# Patient Record
Sex: MALE | Race: WHITE | NOT HISPANIC OR LATINO | URBAN - METROPOLITAN AREA
[De-identification: names, ages, dates, MRNs, and addresses within clinical notes are randomized per-mention and may not be internally consistent; named-entity substitution may affect disease eponyms.]

---
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
## 2018-07-20 ENCOUNTER — OFFICE VISIT - RIVER FALLS (OUTPATIENT)
Dept: FAMILY MEDICINE | Facility: CLINIC | Age: 18
End: 2018-07-20

## 2018-07-20 ASSESSMENT — MIFFLIN-ST. JEOR: SCORE: 1795.04

## 2020-05-29 ENCOUNTER — OFFICE VISIT - RIVER FALLS (OUTPATIENT)
Dept: FAMILY MEDICINE | Facility: CLINIC | Age: 20
End: 2020-05-29

## 2020-05-29 ENCOUNTER — TRANSFERRED RECORDS (OUTPATIENT)
Dept: HEALTH INFORMATION MANAGEMENT | Facility: CLINIC | Age: 20
End: 2020-05-29

## 2020-06-01 ENCOUNTER — MEDICAL CORRESPONDENCE (OUTPATIENT)
Dept: HEALTH INFORMATION MANAGEMENT | Facility: CLINIC | Age: 20
End: 2020-06-01

## 2020-06-02 ENCOUNTER — COMMUNICATION - RIVER FALLS (OUTPATIENT)
Dept: FAMILY MEDICINE | Facility: CLINIC | Age: 20
End: 2020-06-02

## 2020-06-02 ENCOUNTER — OFFICE VISIT - RIVER FALLS (OUTPATIENT)
Dept: FAMILY MEDICINE | Facility: CLINIC | Age: 20
End: 2020-06-02

## 2020-06-02 ENCOUNTER — TRANSCRIBE ORDERS (OUTPATIENT)
Dept: OTHER | Age: 20
End: 2020-06-02

## 2020-06-02 ENCOUNTER — RECORDS - HEALTHEAST (OUTPATIENT)
Dept: ADMINISTRATIVE | Facility: OTHER | Age: 20
End: 2020-06-02

## 2020-06-02 DIAGNOSIS — D69.6 THROMBOCYTOPENIA (H): Primary | ICD-10-CM

## 2020-06-02 LAB — HETEROPHILE - HISTORICAL: NEGATIVE

## 2020-06-03 ENCOUNTER — AMBULATORY - RIVER FALLS (OUTPATIENT)
Dept: FAMILY MEDICINE | Facility: CLINIC | Age: 20
End: 2020-06-03

## 2020-06-03 ENCOUNTER — PATIENT OUTREACH (OUTPATIENT)
Dept: ONCOLOGY | Facility: CLINIC | Age: 20
End: 2020-06-03

## 2020-06-03 ENCOUNTER — COMMUNICATION - RIVER FALLS (OUTPATIENT)
Dept: FAMILY MEDICINE | Facility: CLINIC | Age: 20
End: 2020-06-03

## 2020-06-03 NOTE — PROGRESS NOTES
New Patient Oncology Nurse Navigator Note     Referring provider: Carrol Amado    Referring Clinic/Organization: Other - [facility]     Referred to: Benign Hematology    Requested provider (if applicable): First available - did not specify   Requesting [facility]    Referral Received: 06/01/20       Evaluation for : Thrombcytopenia     Clinical History (per Nurse review of records provided):      Recently was seen in Urgent care for dizziness/ feeling faint/ panic attack.  Noted to have platelet count of 112.  Previously had noted a low count one other time.  Reports having prolonged nosebleeds.    Has not had a hematology work up    Clinical Assessment / Barriers to Care (Per Nurse):  Does not live locally,  Requesting [facility]     Records Location: Faxed - Media tab/Scanned     Records Needed:     none    Additional testing needed prior to consult:     None    PLAN:  Schedule with benign hematology, will look into providers at [facility]    Alia Vides LPN

## 2020-06-04 LAB
A/G RATIO - HISTORICAL: 2.5 (ref 1–2.5)
ALBUMIN SERPL-MCNC: 5.3 GM/DL (ref 3.6–5.1)
ALP SERPL-CCNC: 64 UNIT/L (ref 36–130)
ALT SERPL W P-5'-P-CCNC: 11 UNIT/L (ref 9–46)
AST SERPL W P-5'-P-CCNC: 16 UNIT/L (ref 10–40)
BASOPHILS # BLD MANUAL: 31 10*3/UL (ref 0–200)
BASOPHILS NFR BLD MANUAL: 0.4 %
BILIRUB DIRECT SERPL-MCNC: 0.4 MG/DL
BILIRUB INDIRECT SERPL-MCNC: 2.2 MG/DL (ref 0.2–1.2)
BILIRUB SERPL-MCNC: 2.6 MG/DL (ref 0.2–1.2)
EOSINOPHIL # BLD MANUAL: 31 10*3/UL (ref 15–500)
EOSINOPHIL NFR BLD MANUAL: 0.4 %
ERYTHROCYTE [DISTWIDTH] IN BLOOD BY AUTOMATED COUNT: 12.6 % (ref 11–15)
GLOBULIN: 2.1 (ref 1.9–3.7)
HCT VFR BLD AUTO: 43.8 % (ref 38.5–50)
HGB BLD-MCNC: 15.7 GM/DL (ref 13.2–17.1)
LYMPHOCYTES # BLD MANUAL: 1140 10*3/UL (ref 850–3900)
LYMPHOCYTES NFR BLD MANUAL: 14.8 %
MCH RBC QN AUTO: 30 PG (ref 27–33)
MCHC RBC AUTO-ENTMCNC: 35.8 GM/DL (ref 32–36)
MCV RBC AUTO: 83.6 FL (ref 80–100)
MONOCYTES # BLD MANUAL: 493 10*3/UL (ref 200–950)
MONOCYTES NFR BLD MANUAL: 6.4 %
NEUTROPHILS # BLD MANUAL: 6006 10*3/UL (ref 1500–7800)
NEUTROPHILS NFR BLD MANUAL: 78 %
PLATELET # BLD AUTO: 135 10*3/UL (ref 140–400)
PMV BLD: 13.7 FL (ref 7.5–12.5)
PROT SERPL-MCNC: 7.4 GM/DL (ref 6.1–8.1)
RBC # BLD AUTO: 5.24 10*6/UL (ref 4.2–5.8)
WBC # BLD AUTO: 7.7 10*3/UL (ref 3.8–10.8)

## 2020-06-08 ENCOUNTER — COMMUNICATION - HEALTHEAST (OUTPATIENT)
Dept: ADMINISTRATIVE | Facility: HOSPITAL | Age: 20
End: 2020-06-08

## 2020-06-12 ENCOUNTER — COMMUNICATION - RIVER FALLS (OUTPATIENT)
Dept: FAMILY MEDICINE | Facility: CLINIC | Age: 20
End: 2020-06-12

## 2020-06-26 ENCOUNTER — OFFICE VISIT - RIVER FALLS (OUTPATIENT)
Dept: FAMILY MEDICINE | Facility: CLINIC | Age: 20
End: 2020-06-26

## 2021-06-16 PROBLEM — D69.6 THROMBOCYTOPENIA (H): Status: ACTIVE | Noted: 2020-06-04

## 2021-06-16 PROBLEM — R74.8 ABNORMAL LIVER ENZYMES: Status: ACTIVE | Noted: 2020-06-04

## 2021-06-16 PROBLEM — R09.A2 GLOBUS SENSATION: Status: ACTIVE | Noted: 2020-06-04

## 2021-06-16 PROBLEM — R03.0 ELEVATED BLOOD PRESSURE READING WITHOUT DIAGNOSIS OF HYPERTENSION: Status: ACTIVE | Noted: 2020-06-04

## 2021-06-16 PROBLEM — R94.31 NONSPECIFIC ABNORMAL ELECTROCARDIOGRAM (ECG) (EKG): Status: ACTIVE | Noted: 2020-06-04

## 2021-06-26 ENCOUNTER — HEALTH MAINTENANCE LETTER (OUTPATIENT)
Age: 21
End: 2021-06-26

## 2021-10-16 ENCOUNTER — HEALTH MAINTENANCE LETTER (OUTPATIENT)
Age: 21
End: 2021-10-16

## 2022-02-12 VITALS
HEIGHT: 70 IN | SYSTOLIC BLOOD PRESSURE: 106 MMHG | OXYGEN SATURATION: 97 % | TEMPERATURE: 98.6 F | HEART RATE: 66 BPM | BODY MASS INDEX: 24.51 KG/M2 | DIASTOLIC BLOOD PRESSURE: 60 MMHG | WEIGHT: 171.2 LBS

## 2022-02-12 VITALS
HEIGHT: 70 IN | BODY MASS INDEX: 25.05 KG/M2 | WEIGHT: 175 LBS | SYSTOLIC BLOOD PRESSURE: 124 MMHG | TEMPERATURE: 98.6 F | HEART RATE: 60 BPM | DIASTOLIC BLOOD PRESSURE: 66 MMHG

## 2022-02-12 VITALS
OXYGEN SATURATION: 95 % | HEART RATE: 70 BPM | TEMPERATURE: 98.5 F | DIASTOLIC BLOOD PRESSURE: 78 MMHG | WEIGHT: 178.6 LBS | SYSTOLIC BLOOD PRESSURE: 142 MMHG

## 2022-02-16 NOTE — PROGRESS NOTES
Patient Information     Name:SU MANDEL      Address:       [address], [place] [id number]     Sex:Male     YOB: 2000     [phone]     Emergency Contact:IMELDA MANDEL     MRN:[id number]     FIN:[id number]     Location:[facility]     Date of Service:06/26/2020      Primary Care Physician:       Raul Galloway PA-C, [phone]      Attending Physician:       Trang Villarreal MD, [phone]  Subjective      Today's visit was conducted via telemedicine, video,  due to the COVID-19 pandemic.       Patient consent, as follows, for telemedicine visit was obtained.   You have been scheduled for a telemedicine visit, which is a billable service.  This is a replacement for a face-to-face visit that is being recommended at this time to help keep our patient safe.  If, during our visit , we decide that you need a face-to-face visit, this visit will be canceled and you will be rescheduled to come into the clinic for a face to face appointment.  Can we proceed with a telemedicine visit?       HPI      Patient was recently hospitalized for further evaluation of his dysphasia, elevated bilirubin, and thrombocytopenia.  He has not been evaluated by hematology.  He reports that his CBC had been repeated while he was inpatient and he was told that it was normal.  He underwent an ultrasound of his abdomen which showed no abnormalities.  He also had an abnormal EKG no arrhythmias were noted on telemetry.  His echocardiogram was normal.  He reports that they did a bedside swallow study.  He had no difficulty protecting his airway on swallow study.  He was a little disappointed that no further evaluation was done for this.  He had a negative HIV test.  I explained to him that sometimes people have the symptoms due to GERD or due to an esophageal fungal infection which is more common in people that use inhaled steroids or have a suppressed immune system.  He does report that  overall this is improving.  However it has not resolved.  He has had some increased reflux symptoms.      ROS 10 point ROS is neg except as per HPI      Review of systems is negative except as per HPI including:  no fevers, chills, sore throat, runny nose, nausea, vomiting, constipation, diarrhea, rash or new skin lesions, chest pain, palpitations, slurred speech, new paresthesia, shortness of breath or wheeze.      Exam:      General: alert and oriented ×3 no acute distress.      HEENT: pupils are equal round and reactive to light extraocular motion is intact. Normocephalic and atraumatic.       Hearing is grossly normal and there is no otorrhea.       Nares are patent there is no rhinorrhea.       Mucous membranes are moist and pink.      Chest: has bilateral rise with no increased work of breathing.      Cardiovascular: normal perfusion and brisk capillary refill.      Musculoskeletal: no gross focal abnormalities and normal gait.      Neuro: no gross focal abnormalities and memory seems intact.      Psychiatric: speech is clear and coherent and fluent. Patient dressed appropriately for the weather. Mood is appropriate and affect is full.  judgement and insight are normal, no A/V hallucinations, no S/H ideation.  thought process linear                     Discussed with patient to return to clinic if symptoms worsen or do not improve  Assessment/Plan       Dysphagia (R13.10)         Ordered:          85514 office outpatient visit 15 minutes (Charge), Quantity: 1, Globus pharyngeus  Thrombocytopenia  Dysphagia  GERD with esophagitis  Elevated bilirubin                Elevated bilirubin (R17)         Ordered:          64499 office outpatient visit 15 minutes (Charge), Quantity: 1, Globus pharyngeus  Thrombocytopenia  Dysphagia  GERD with esophagitis  Elevated bilirubin                GERD with esophagitis (K21.0)         Ordered:          pantoprazole, = 1 tab(s) ( 40 mg ), Oral, daily, # 14 tab(s), 1  Refill(s), Type: Maintenance, Pharmacy: [id number], 1 tab(s) Oral daily, 70, in, 07/20/18 13:36:00 CDT, Height Measured, 178.6, lb, 06/02/20 15:39:00 CDT, Weight Measured, (Ordered)          00448 office outpatient visit 15 minutes (Charge), Quantity: 1, Globus pharyngeus  Thrombocytopenia  Dysphagia  GERD with esophagitis  Elevated bilirubin                Globus pharyngeus (R09.89)         Ordered:          96315 office outpatient visit 15 minutes (Charge), Quantity: 1, Globus pharyngeus  Thrombocytopenia  Dysphagia  GERD with esophagitis  Elevated bilirubin                Thrombocytopenia (D69.6)         Ordered:          50894 office outpatient visit 15 minutes (Charge), Quantity: 1, Globus pharyngeus  Thrombocytopenia  Dysphagia  GERD with esophagitis  Elevated bilirubin                Orders:         Return to Clinic (Request), RFV: f/u GERD- video visit, Return in 3 weeks      Dysphasia seems to be improving would like patient to try a two-week course of proton pump inhibitor to see if this helps resolve the situation.  If his is not resolved then we may consider referral to GI.  We could also of course consider referral to someone here in town that would be able to do this test for him also.      Elevated bilirubin I think we will continue to monitor this it possibly Gilbert disease.  Provided patient with the spelling for this so that he could do some home research on this.  We will also get his fractionated bilirubins.      Review of discharge summary shows that peripheral blood smear was pending at the time of patient's discharge.  He will plan to print this out for me so that we can review it together.  He currently has no active bleeding.  We may consider referral to hematology.  We plan to schedule a follow-up appointment in about 3 weeks.

## 2022-02-16 NOTE — NURSING NOTE
Comprehensive Intake Entered On:  6/2/2020 3:48 PM CDT    Performed On:  6/2/2020 3:39 PM CDT by Sweta Fisher CMA   Chief Complaint :   c/o sore throat, lightheadedness episodes since Thursday. Was seen in  5/28 and was told his sx were thrombocytopenia and anxiety.    Menstrual Status :   N/A   Weight Measured :   178.6 lb(Converted to: 178 lb 10 oz, 81.01 kg)    Systolic Blood Pressure :   142 mmHg (HI)    Diastolic Blood Pressure :   78 mmHg   Mean Arterial Pressure :   99 mmHg   Peripheral Pulse Rate :   70 bpm   BP Site :   Right arm   BP Method :   Manual   HR Method :   Electronic   Temperature Tympanic :   98.5 DegF(Converted to: 36.9 DegC)    Oxygen Saturation :   95 %   Sweta Fisher CMA - 6/2/2020 3:39 PM CDT   Health Status   Allergies Verified? :   Yes   Medication History Verified? :   Yes   Immunizations Current :   Yes   Pre-Visit Planning Status :   N/A   Tobacco Use? :   Never smoker   Sweta Fisher CMA - 6/2/2020 3:39 PM CDT   Consents   Consent for Immunization Exchange :   Consent Granted   Consent for Immunizations to Providers :   Consent Granted   Sweta Fisher CMA - 6/2/2020 3:39 PM CDT   Meds / Allergies   (As Of: 6/2/2020 3:48:10 PM CDT)   Allergies (Active)   No Known Medication Allergies  Estimated Onset Date:   Unspecified ; Created By:   Agnes Nunez CMA; Reaction Status:   Active ; Category:   Drug ; Substance:   No Known Medication Allergies ; Type:   Allergy ; Updated By:   Agnes Nunez CMA; Reviewed Date:   7/20/2018 1:41 PM CDT        Medication List   (As Of: 6/2/2020 3:48:10 PM CDT)        ID Risk Screen   Recent Travel History :   No recent travel   Family Member Travel History :   No recent travel   Other Exposure to Infectious Disease :   Unknown   Sweta Fisher CMA - 6/2/2020 3:39 PM CDT

## 2022-02-16 NOTE — TELEPHONE ENCOUNTER
---------------------  From: Radha Hinson CMA   To: Appointment Pool ([id number] - [place]);     Sent: 6/23/2020 4:16:55 PM CDT  Subject: Due appt/hospital f/u     Please call pt to schedule a hospital f/u with [facility] after d/c on 6/5 from [facility] [place] for abnormal LFT's and dysphagiadone

## 2022-02-16 NOTE — PROGRESS NOTES
Patient:   SU MANDEL            MRN: [id number]            FIN: [id number]               Age:   19 years     Sex:  Male     :  2000   Associated Diagnoses:   None   Author:   Phil ALVAREZ, Trang      Procedure   EKG procedure   Indication: palpitations.     Position: supine.     EKG findings   Interpretation: by primary care provider.     Rhythm: sinus normal.     Axis: normal axis, normal configuration.     Within normal limits.     Intervals: TX normal, QRS normal, QT normal.     P waves: normal.     QRS complex: LVH by voltage.     ST-T-U complex: normal.     Interpretation: left ventricular hypertrophy.     Discussed: with patient.        Impression and Plan   Orders

## 2022-02-16 NOTE — TELEPHONE ENCOUNTER
---------------------  From: Trang Villarreal MD   To: [facility] Message Pool ([id number] - [place]);     Sent: 6/5/2020 8:40:32 AM CDT  Subject: General Message     please try to give patient a call later this morning to see how his overnight in [facility] is going.  Have they learned anything else?  Does he get to come home today?  Are they getting him set up for outpatient follow up?  etc...  thanksMB @ [phone]

## 2022-02-16 NOTE — NURSING NOTE
Comprehensive Intake Entered On:  5/29/2020 10:09 AM CDT    Performed On:  5/29/2020 10:03 AM CDT by Adriana Lamas CMA               Summary   Chief Complaint :   Verbal consent given for video visit. Follow-up UC visit last night. Driving and felt lightheaded, dizzy, heavy heartbeat. Went to  in [facility]. Told it was thrombocytopenia and anxiety related. No new meds.    Menstrual Status :   N/A   Adriana Lamas CMA - 5/29/2020 10:03 AM CDT   Health Status   Allergies Verified? :   Yes   Medication History Verified? :   Yes   Immunizations Current :   Yes   Pre-Visit Planning Status :   Not completed   Adriana Lamas CMA - 5/29/2020 10:03 AM CDT   Meds / Allergies   (As Of: 5/29/2020 10:09:05 AM CDT)   Allergies (Active)   No known allergies  Estimated Onset Date:   Unspecified ; Created By:   Randa Storey CMA; Reaction Status:   Active ; Category:   Drug ; Substance:   No known allergies ; Type:   Allergy ; Updated By:   Randa Storey CMA; Reviewed Date:   7/20/2018 1:41 PM CDT      No Known Medication Allergies  Estimated Onset Date:   Unspecified ; Created By:   Agnes Nunez CMA; Reaction Status:   Active ; Category:   Drug ; Substance:   No Known Medication Allergies ; Type:   Allergy ; Updated By:   Agnes Nunez CMA; Reviewed Date:   7/20/2018 1:41 PM CDT        Medication List   (As Of: 5/29/2020 10:09:05 AM CDT)   No Known Home Medications     Adriana Lamas CMA - 5/29/2020 10:06:35 AM           ID Risk Screen   Recent Travel History :   No recent travel   Family Member Travel History :   No recent travel   Other Exposure to Infectious Disease :   Unknown   Adriana Lamas CMA - 5/29/2020 10:03 AM CDT

## 2022-02-16 NOTE — NURSING NOTE
Comprehensive Intake Entered On:  6/26/2020 11:55 AM CDT    Performed On:  6/26/2020 11:52 AM CDT by Sweta Fisher CMA   Chief Complaint :   f/u hospital discharge 6/5. Verbal consent given for video visit.    Menstrual Status :   N/A   Sweta Fisher CMA - 6/26/2020 11:52 AM CDT   Health Status   Allergies Verified? :   Yes   Medication History Verified? :   Yes   Immunizations Current :   Yes   Pre-Visit Planning Status :   N/A   Tobacco Use? :   Never smoker   Sweta Fisher CMA - 6/26/2020 11:52 AM CDT   Consents   Consent for Immunization Exchange :   Consent Granted   Consent for Immunizations to Providers :   Consent Granted   Sweta Fisher CMA - 6/26/2020 11:52 AM CDT   Meds / Allergies   (As Of: 6/26/2020 11:55:05 AM CDT)   Allergies (Active)   No Known Medication Allergies  Estimated Onset Date:   Unspecified ; Created By:   Agnes Nunez CMA; Reaction Status:   Active ; Category:   Drug ; Substance:   No Known Medication Allergies ; Type:   Allergy ; Updated By:   Agnes Nunez CMA; Reviewed Date:   7/20/2018 1:41 PM CDT        Medication List   (As Of: 6/26/2020 11:55:05 AM CDT)   Prescription/Discharge Order    LORazepam  :   LORazepam ; Status:   Prescribed ; Ordered As Mnemonic:   LORazepam 1 mg oral tablet ; Simple Display Line:   1 mg, 1 tab(s), Oral, once, PRN: anxiety, 1 tab(s), 0 Refill(s) ; Ordering Provider:   Trang Villarreal MD; Catalog Code:   LORazepam ; Order Dt/Tm:   6/3/2020 3:31:59 PM CDT            ID Risk Screen   Recent Travel History :   No recent travel   Family Member Travel History :   No recent travel   Other Exposure to Infectious Disease :   Unknown   Sweta Fisher CMA - 6/26/2020 11:52 AM CDT

## 2022-02-16 NOTE — PROGRESS NOTES
Chief Complaint    Verbal consent given for video visit. Follow-up  visit last night. Driving and felt lightheaded, dizzy, heavy heartbeat. Went to  in [facility]. Told it was thrombocytopenia and anxiety related. No new meds.  History of Present Illness      Today's visit was conducted via vidoe due to the COVID-19 pandemic. Patient's consent to video visit was obtained and documented.      Call Start Time:   10:40am      Call End Time:   11:02am      Patient is a 19-year-old male who is doing a video visit today because of the coronavirus pandemic.      Yesterday he was driving a 5-hour drive.  About skilled nursing into head his throat started feeling weird and stuff was building up in his throat.  Then his mouth became very dry his face was flushed and his hands were tingling.  He felt like he was going to pass out.  He did not pass out or lose consciousness.  He remembers thinking that he was skilled nursing through his drive and would need to drive at least 2 hours to get back home or to his destination.       He thinks he was nervous about the sore throat possibly being a sign of coronavirus.       He started in urgent care and they did some labs.  The only thing abnormal was low platelets at 112.  He had a blood test done for [employer] about a year ago which showed low platelets as well.  He has not otherwise ever had a work-up for the low platelets.  He does note that it has always been hard for him to get nosebleeds to stop.       At the urgent care he noticed that his blood pressure was elevated to a systolic of 148 and that it took a long time for his heart rate to come down from 1 20-90.  Even the 90 felt fast for him.       He did arrive home safely to his parents house last night.  He slept well last night.  He generally feels a little weak this morning but otherwise feels well.       No fever, sore throat, chest pain, shortness of breath, cough.       No history of anxiety but he does feel that things have  felt stressful with the pandemic.  Review of Systems      Negative except as listed in HPI.  Physical Exam      Video visit.      In general he is alert and calm on the visit.      Appropriate affect.  Assessment/Plan       Panic attack (F41.0)         Discussed with the patient that I do agree with the urgent care that this does sound like a panic attack.  Discussed that the times right now are very stressful with the coronavirus pandemic and it sounds like he was stressed with having symptoms while driving alone.         Suggested square breathing and demonstrated it.          Recommended 4-7-8 breathing to be done 4 breaths twice a day.  Demonstrated how to do this breathing and also directed him to look up the YouTube video by Dr. Weil.          Encouraged him to follow-up if he continues to have symptoms of anxiety.                Thrombocytopenia (D69.6)         He has never had a work-up for the thrombocytopenia and so we will do a referral to hematology for this.  Reassured him that this is likely chronic and likely will not get worse but that it would not be a bad idea to get this looked at.  It is possible that the appointment could be a few months out due to the pandemic.  Patient verbalizes understanding.         Ordered:          Referral (Request), 05/29/20 11:16:00 CDT, Referred to: Hematology, Referred to: hematology, Reason for referral: work up for thrombocytopenia not urgent, Priority: Routine, Thrombocytopenia           Patient Information     Name:SU MANDEL      Address:      [address] [place] [id number]     Sex:Male     YOB: 2000     [phone]     Emergency Contact:IMELDA MANDEL     MRN:[id number]     FIN:[id number]     Location:[facility]     Date of Service:05/29/2020      Primary Care Physician:       Raul Galloway PA-C, [phone]      Attending Physician:       Carrol Amado MD, [phone]  Problem List/Past Medical  History    Ongoing     Seasonal Allergies    Historical     No qualifying data  Medications   No active medications  Allergies    No Known Medication Allergies    No known allergies  Social History    Smoking Status - 07/20/2018     Never smoker     Alcohol - Denies Alcohol Use, 08/02/2018      Never, 08/02/2018     Employment/School      Work/School description: Construction., 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 Exercise type: Running, Weight lifting., 08/02/2018     Nutrition/Health      Type of diet: Regular., 08/02/2018     Sexual      Sexually active: Yes. Sexual orientation: Straight or heterosexual. Uses condoms: Yes., 08/02/2018     Substance Abuse - Denies Substance Abuse, 08/02/2018      Never, 08/02/2018     Tobacco - Denies Tobacco Use, 08/02/2018      Never (less than 100 in lifetime), 08/02/2018  Family History    Seasonal allergies.: Father.  Immunizations      Vaccine Date Status          Hep A, pediatric/adolescent 07/13/2017 Given          human papillomavirus vaccine 07/13/2017 Given          meningococcal conjugate vaccine 09/02/2016 Given              Comments : [9/2/2016] Pt consented          meningococcal conjugate vaccine 07/16/2012 Given          meningococcal conjugate vaccine 07/16/2012 Recorded          Td 08/26/2009 Recorded          Td 08/26/2009 Recorded          influenza 08/26/2009 Recorded          DTaP 08/20/2003 Recorded          DTaP 08/20/2003 Recorded          MMR (measles/mumps/rubella) 08/20/2003 Recorded          MMR (measles/mumps/rubella) 08/20/2003 Recorded          IPV 08/20/2003 Recorded          IPV 08/20/2003 Recorded          varicella 11/10/1999 Recorded          varicella 11/10/1999 Recorded          DTaP 11/10/1999 Recorded          DTaP 11/10/1999 Recorded          MMR (measles/mumps/rubella) 11/10/1999 Recorded          MMR (measles/mumps/rubella) 11/10/1999 Recorded          IPV 11/10/1999 Recorded          IPV 11/10/1999  Recorded          Hib (HbOC) 11/10/1999 Recorded          Hib (HbOC) 11/10/1999 Recorded          DTaP 02/17/1999 Recorded          DTaP 02/17/1999 Recorded          hepatitis B pediatric vaccine 02/17/1999 Recorded          hepatitis B pediatric vaccine 02/17/1999 Recorded          Hib (HbOC) 02/17/1999 Recorded          Hib (HbOC) 02/17/1999 Recorded          DTaP 1998 Recorded          DTaP 1998 Recorded          IPV 1998 Recorded          IPV 1998 Recorded          Hib (HbOC) 1998 Recorded          Hib (HbOC) 1998 Recorded          DTaP 1998 Recorded          DTaP 1998 Recorded          hepatitis B pediatric vaccine 1998 Recorded          hepatitis B pediatric vaccine 1998 Recorded          IPV 1998 Recorded          IPV 1998 Recorded          Hib (HbOC) 1998 Recorded          Hib (HbOC) 1998 Recorded          hepatitis B pediatric vaccine 1998 Recorded          hepatitis B pediatric vaccine 1998 Recorded

## 2022-02-16 NOTE — LETTER
(Inserted Image. Unable to display)   July 21, 2020        SU MANDEL   [address] [place], [place] [id number]        Dear SU,      Thank you for selecting [facility] for your healthcare needs.    Our records indicate you are due for the following services:     Follow-up office visit     To schedule an appointment or if you have further questions, please contact your primary clinic:   [facility]       [phone]   [facility]       [phone]              [facility]     [phone]      Powered by [employer]    Sincerely,    Trang Villarreal MD

## 2022-02-16 NOTE — TELEPHONE ENCOUNTER
---------------------  From: Agnes Chaney LPN (Phone Messages Pool ([id number]_[facility]))   To: [facility] Message Pool ([id number]_[place] - [place]);     Sent: 6/3/2020 3:28:23 PM CDT  Subject: General Message       Phone Message Template      PCP:   K[facility]      Time of Call:  1428       Person Calling:  Kell pineda  Phone number:  [phone]    Returned call at: _    Note:   mom LVM stating that she has called the pharmacy twice and prescription is not there yet. she is just wonder when it will be sent.     Last office visit and reason:  _Spoke to mom- [facility] sending med now.

## 2022-02-16 NOTE — PROGRESS NOTES
Patient:   SU MANDEL            MRN: [id number]            FIN: [id number]               Age:   16 years     Sex:  Male     :  2000   Associated Diagnoses:   Well adult   Author:   Corey Romano MD      Visit Information      Date of Service: 2017 09:54 am  Performing Location: [facility]  Encounter#: [id number]      Primary Care Provider (PCP):  Raul Galloway PA-C    NPI# [id number]      Referring Provider:  Corey Romano MD    NPI# [id number]      Chief Complaint   2017 10:06 AM CDT   Pateint here for sports physical     Chief complaint and symptoms noted above confirmed with patient.      Well Adult History   Well Adult History             The patient presents for well adult exam.  The patient's general health status is described as good.  The patient's diet is described as balanced.  Exercise: routine, aerobic activity, anaerobic activity.  Associated symptoms consist of none.  Additional pertinent history: occasional caffeine use, tobacco use none and no alcohol use.        Review of Systems   Constitutional:  Negative.    Ear/Nose/Mouth/Throat:  Negative.    Respiratory:  Negative.    Cardiovascular:  Negative.    Gastrointestinal:  Negative.    Genitourinary:  Negative.    Hematology/Lymphatics:  Negative.    Endocrine:  Negative.    Immunologic:  Negative.    Musculoskeletal:  Negative.    Integumentary:  Negative.    Neurologic:  Negative.    Psychiatric:  Negative.       Health Status   Allergies:    Allergic Reactions (Selected)  No known allergies  No Known Medication Allergies   Medications:  (Selected)      Problem list:    All Problems  Seasonal Allergies / ICD-9-.9 / Confirmed      Histories   Past Medical History:    Active  Seasonal Allergies (ICD-9-.9)   Family History:    Seasonal allergies.  Father     Procedure history:    No active procedure history items have been selected or recorded.   Social History:             No active social  history items have been recorded.      Physical Examination   Vital Signs   7/13/2017 10:06 AM CDT Temperature Temporal 98.6 DegF    Peripheral Pulse Rate 66 bpm    Systolic Blood Pressure 106 mmHg    Diastolic Blood Pressure 60 mmHg    Mean Arterial Pressure 75 mmHg    Oxygen Saturation 97 %      Measurements from flowsheet : Measurements   7/13/2017 10:06 AM CDT Height Measured - Standard 70.25 in    Weight Measured - Standard 171.2 lb    BSA 1.96 m2    Body Mass Index 24.39 kg/m2    Body Mass Index Percentile 71.89      General:  Alert and oriented, No acute distress.    Eye:  Pupils are equal, round and reactive to light, Extraocular movements are intact, Normal conjunctiva.    HENT:  Normocephalic.    Neck:  Supple, Non-tender.    Respiratory:  Lungs are clear to auscultation, Respirations are non-labored.    Cardiovascular:  Normal rate, Regular rhythm, No murmur.    Gastrointestinal:  Soft, Non-tender, Non-distended.    Genitourinary:  No costovertebral angle tenderness.    Lymphatics:  No lymphadenopathy neck, axilla, groin.    Musculoskeletal:  Normal range of motion, Normal strength, No tenderness.    Integumentary:  Warm, Dry, Pink.    Neurologic:  Alert, Oriented, Normal sensory.    Psychiatric:  Patient's PHQ9 and CAGE questionnaire reviewed and discussed with patient..       Impression and Plan   Diagnosis     Well adult (CPS22-QE Z00.00).     Course:  Progressing as expected.    Summary:  Form filled out for college Sports, Hep A and HPV.

## 2022-02-16 NOTE — LETTER
(Inserted Image. Unable to display)   July 22, 2019      SU MANDEL   [address] [place], [place] [id number]        Dear SU,      Thank you for selecting [facility] (previously [facility]) for your healthcare needs.     Our records indicate you are due for the following services:     Annual Physical    To schedule an appointment or if you have further questions, please contact your primary clinic:   [facility]          [phone]   [facility]    [phone]             [facility]         [phone]      Powered by [employer] and [employer]    Sincerely,    Raul Galloway PA-C

## 2022-02-16 NOTE — PROGRESS NOTES
Patient:   SU MANDEL            MRN: [id number]            FIN: [id number]               Age:   17 years     Sex:  Male     :  2000   Associated Diagnoses:   Sports physical; Annual physical exam   Author:   Raul Galloway PA-C      Visit Information      Date of Service: 2018 01:14 pm  Performing Location: [facility]  Encounter#: [id number]      Primary Care Provider (PCP):  Raul Galloway PA-C    NPI# [id number]      Referring Provider:  Raul Galloway PA-C# [id number]   Visit type:  Annual exam.    Accompanied by:  No one.    Source of history:  Medical record.    Referral source:  Self.    History limitation:  None.       Chief Complaint   2018 1:36 PM CDT    College Sports PX        Well Adult History   Well Adult History             The patient presents for well adult exam.  The patient's general health status is described as good.  The patient's diet is described as balanced.     Wrestling exam for [facility]      Review of Systems   Constitutional:  Negative.    Eye:  Negative.    Ear/Nose/Mouth/Throat:  Negative.    Respiratory:  Negative.    Cardiovascular:  Negative.    Gastrointestinal:  Negative.    Genitourinary:  Negative.    Hematology/Lymphatics:  Negative.    Endocrine:  Negative.    Immunologic:  Negative.    Musculoskeletal:  Negative.    Integumentary:  Negative.    Neurologic:  Negative.    Psychiatric:  Negative.    All other systems reviewed and negative      Health Status   Allergies:    Allergic Reactions (All)  No known allergies  No Known Medication Allergies   Medications:  (Selected)      Problem list:    All Problems  Seasonal Allergies / ICD-9-.9 / Confirmed      Histories   Past Medical History:    Active  Seasonal Allergies (477.9)   Family History:    Seasonal allergies.  Father     Procedure history:    No active procedure history items have been selected or recorded.   Social History:             No active social history items have  been recorded.      Physical Examination   Vital Signs   7/20/2018 1:36 PM CDT Temperature Tympanic 98.6 DegF    Peripheral Pulse Rate 60 bpm    Pulse Site Radial artery    HR Method Manual    Systolic Blood Pressure 124 mmHg    Diastolic Blood Pressure 66 mmHg    Mean Arterial Pressure 85 mmHg    BP Site Right arm    BP Method Manual      Measurements from flowsheet : Measurements   7/20/2018 1:36 PM CDT Height Measured - Standard 70 in    Weight Measured - Standard 175 lb    BSA 1.98 m2    Body Mass Index 25.11 kg/m2    Body Mass Index Percentile 72.68      General:  No acute distress.    Eye:  Pupils are equal, round and reactive to light, Extraocular movements are intact, Normal conjunctiva.    HENT:  Normocephalic, Tympanic membranes are clear, Oral mucosa is moist, No pharyngeal erythema.    Neck:  Supple, Non-tender, No lymphadenopathy, No thyromegaly.    Respiratory:  Lungs are clear to auscultation, Respirations are non-labored, Breath sounds are equal.    Cardiovascular:  Normal rate, Regular rhythm, No murmur.    Gastrointestinal:  Soft, Non-tender, Non-distended, Normal bowel sounds, No organomegaly.    Genitourinary:  No costovertebral angle tenderness.    Integumentary:  No rash.    Neurologic:  No focal deficits.    Psychiatric:  Cooperative, Appropriate mood & affect.       Health Maintenance      Recommendations     Pending (in the next year)        OverDue           Alcohol Misuse Screen (Male) due  09/02/17  and every 1  year(s)           Depression Screen (Male) due  09/02/17  and every 1  year(s)        Due            HIV Screen (if sexually active) (Male) due  07/20/18  and every 1  year(s)           STD Counseling (if sexually active) (Male) due  07/20/18  and every 1  year(s)           Syphilis Screen (if sexually active) (Male) due  07/20/18  and every 1  year(s)     Satisfied (in the past 1 year)        Satisfied            Body Mass Index Check (Male) on  07/20/18.           High Blood  Pressure Screen (Male) on  07/20/18.           Tobacco Use Screen (Male) on  07/20/18.          Impression and Plan   PHQ-9 and Cage screening performed and reviewed.   Diagnosis     Sports physical ([id number]-UL Z02.5).     Sports physical (QCD35-WJ Z02.5).     Annual physical exam (VCK96-ZW Z00.00).     See forms in chart.

## 2022-02-16 NOTE — PROGRESS NOTES
Chief Complaint    c/o sore throat, lightheadedness episodes since Thursday. Was seen in  5/28 and was told his sx were thrombocytopenia and anxiety.  History of Present Illness      patient present to clinic today for follow up of recent trip to , was driving to visit his girlfriend and felt like something was caught in his throat, has found it easier to swallow liquids than solids, EKG on D/C summary shows a RBB, he had elevated Tbili unfractionated and thrombocytopenia.  repeat EKG today shows no RBB but he has LVH, he has felt palpitations, dry mouth, was diagnosed with anxiety attack and told to follow up but has not been feeling anxious, has had a few more episodes since that time.      no decrease in smell or taste, no change in BM, no fevers or low body temps or new rashes      Review of systems 12 point review of systems negative except as per HPI      Exam:      General: alert and oriented ×3 no acute distress.      HEENT: pupils are equal round and reactive to light extraocular motion is intact. Normocephalic and atraumatic.       Hearing is grossly normal and there is no otorrhea. TM pearly grey with normal light reflex, left outer ear cauliflower appearance, chronic.      Nares are patent there is no rhinorrhea.       Mucous membranes are moist and pink. + pharyngeal cobblestoning      Chest: has bilateral rise with no increased work of breathing.  ctab no wheezes,  rhonchi or rales      Cardiovascular: normal perfusion and brisk capillary refill.  nml s1s2, no m/g/r      Musculoskeletal: no gross focal abnormalities and normal gait.      Neuro: no gross focal abnormalities and memory seems intact.      Psychiatric: speech is clear and coherent and fluent. Patient dressed appropriately for the weather. Mood is appropriate and affect is full.       abd nontender no rebound, normal BS              Discussed with patient to return to clinic if symptoms worsen or do not improve  Physical Exam   Vitals &  Measurements    T: 98.5   F (Tympanic)  HR: 70(Peripheral)  BP: 142/78  SpO2: 95%     WT: 178.6 lb   Assessment/Plan       Abnormal heart rhythms (I49.9)         Ordered:          45901 ecg routine ecg w/least 12 lds w/i+r (Charge), Quantity: 1, Abnormal heart rhythms          15354 office outpatient visit 25 minutes (Charge), Quantity: 1, Palpitations  Post-nasal drip  Globus sensation  Elevated blood pressure reading  Left ventricular hypertrophy  Abnormal heart rhythms  Dysphagia                Dysphagia (R13.10)         Ordered:          34292 office outpatient visit 25 minutes (Charge), Quantity: 1, Palpitations  Post-nasal drip  Globus sensation  Elevated blood pressure reading  Left ventricular hypertrophy  Abnormal heart rhythms  Dysphagia          CBC (includes diff/plt)* ([facility]), Specimen Type: Blood, Collection Date: 06/02/20 16:37:00 CDT          Hepatic Function Panel* ([facility]), Specimen Type: Serum, Collection Date: 06/02/20 16:37:00 CDT          Hepatitis Panel* ([facility]), Specimen Type: Serum, Collection Date: 06/02/20 16:47:00 CDT          Mononucleosis Test, Qual (Request), Priority: Urgent, ABN: Not Required, Post-nasal drip  Globus sensation  Dysphagia          Pathologist review of peripheral smear* ([facility]), Specimen Type: Blood, Collection Date: 06/02/20 16:37:00 CDT          XR Esophagram (Request), Dysphagia  Globus sensation  Post-nasal drip                Elevated blood pressure reading (R03.0)         Ordered:          37403 office outpatient visit 25 minutes (Charge), Quantity: 1, Palpitations  Post-nasal drip  Globus sensation  Elevated blood pressure reading  Left ventricular hypertrophy  Abnormal heart rhythms  Dysphagia          Cardiac Echocardiogram Transthoracic (Request), Priority: Routine, Left ventricular hypertrophy  Elevated blood pressure reading                Globus sensation (R09.89)         Ordered:          46968 office outpatient visit 25 minutes  (Charge), Quantity: 1, Palpitations  Post-nasal drip  Globus sensation  Elevated blood pressure reading  Left ventricular hypertrophy  Abnormal heart rhythms  Dysphagia          CBC (includes diff/plt)* ([facility]), Specimen Type: Blood, Collection Date: 06/02/20 16:37:00 CDT          Hepatic Function Panel* ([facility]), Specimen Type: Serum, Collection Date: 06/02/20 16:37:00 CDT          Hepatitis Panel* ([facility]), Specimen Type: Serum, Collection Date: 06/02/20 16:47:00 CDT          Mononucleosis Test, Qual (Request), Priority: Urgent, ABN: Not Required, Post-nasal drip  Globus sensation  Dysphagia          Pathologist review of peripheral smear* ([facility]), Specimen Type: Blood, Collection Date: 06/02/20 16:37:00 CDT          XR Esophagram (Request), Dysphagia  Globus sensation  Post-nasal drip                Left ventricular hypertrophy (I51.7)         Ordered:          [id number] office outpatient visit 25 minutes (Charge), Quantity: 1, Palpitations  Post-nasal drip  Globus sensation  Elevated blood pressure reading  Left ventricular hypertrophy  Abnormal heart rhythms  Dysphagia          Cardiac Echocardiogram Transthoracic (Request), Priority: Routine, Left ventricular hypertrophy  Elevated blood pressure reading                Palpitations (R00.2)         Ordered:          68571 office outpatient visit 25 minutes (Charge), Quantity: 1, Palpitations  Post-nasal drip  Globus sensation  Elevated blood pressure reading  Left ventricular hypertrophy  Abnormal heart rhythms  Dysphagia          TSH, 3rd Generation with Reflex to Free T4* ([facility]), Specimen Type: Serum, Collection Date: 06/02/20 17:24:00 CDT                Post-nasal drip (R09.82)         Ordered:          05718 office outpatient visit 25 minutes (Charge), Quantity: 1, Palpitations  Post-nasal drip  Globus sensation  Elevated blood pressure reading  Left ventricular hypertrophy  Abnormal heart rhythms  Dysphagia          CBC (includes  diff/plt)* ([facility]), Specimen Type: Blood, Collection Date: 06/02/20 16:37:00 CDT          Hepatic Function Panel* ([facility]), Specimen Type: Serum, Collection Date: 06/02/20 16:37:00 CDT          Hepatitis Panel* ([facility]), Specimen Type: Serum, Collection Date: 06/02/20 16:47:00 CDT          Mononucleosis Test, Qual (Request), Priority: Urgent, ABN: Not Required, Post-nasal drip  Globus sensation  Dysphagia          Pathologist review of peripheral smear* ([facility]), Specimen Type: Blood, Collection Date: 06/02/20 16:37:00 CDT          XR Esophagram (Request), Dysphagia  Globus sensation  Post-nasal drip               will check repeat hepatic panel, hepatitis panel and labs noted above, he will rtc for curbside testing tomorrow, will get echo and have pt rtc if sx worsen or do not improvve, will discuss results with patient as they become available.  pt was here today with his mom, 25 minutes spent with patient in direct face to face contact, > 50% of time spent counseling and coordinating care.   Patient Information     Name:SU MANDEL      Address:      [address] [place] [id number]     Sex:Male     YOB: 2000     [phone]     Emergency Contact:IMELDA MANDEL     MRN:[id number]     FIN:[id number]     Location:[facility]     Date of Service:06/02/2020      Primary Care Physician:       Raul Galloway PA-C, [phone]      Attending Physician:       Trang Villarreal MD, [phone]  Problem List/Past Medical History    Ongoing     Seasonal Allergies    Historical     No qualifying data  Medications   No active medications  Allergies    No Known Medication Allergies  Social History    Smoking Status - 06/02/2020     Never smoker     Alcohol - Denies Alcohol Use, 08/02/2018      Never, 08/02/2018     Employment/School      Work/School description: Construction., 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 Exercise type: Running, Weight  lifting., 08/02/2018     Nutrition/Health      Type of diet: Regular., 08/02/2018     Sexual      Sexually active: Yes. Sexual orientation: Straight or heterosexual. Uses condoms: Yes., 08/02/2018     Substance Abuse - Denies Substance Abuse, 08/02/2018      Never, 08/02/2018     Tobacco - Denies Tobacco Use, 08/02/2018      Never (less than 100 in lifetime), 08/02/2018  Family History    Seasonal allergies.: Father.  Immunizations      Vaccine Date Status          Hep A, pediatric/adolescent 07/13/2017 Given          human papillomavirus vaccine 07/13/2017 Given          meningococcal conjugate vaccine 09/02/2016 Given              Comments : [9/2/2016] Pt consented          meningococcal conjugate vaccine 07/16/2012 Given          meningococcal conjugate vaccine 07/16/2012 Recorded          Td 08/26/2009 Recorded          Td 08/26/2009 Recorded          influenza 08/26/2009 Recorded          DTaP 08/20/2003 Recorded          DTaP 08/20/2003 Recorded          MMR (measles/mumps/rubella) 08/20/2003 Recorded          MMR (measles/mumps/rubella) 08/20/2003 Recorded          IPV 08/20/2003 Recorded          IPV 08/20/2003 Recorded          varicella 11/10/1999 Recorded          varicella 11/10/1999 Recorded          DTaP 11/10/1999 Recorded          DTaP 11/10/1999 Recorded          MMR (measles/mumps/rubella) 11/10/1999 Recorded          MMR (measles/mumps/rubella) 11/10/1999 Recorded          IPV 11/10/1999 Recorded          IPV 11/10/1999 Recorded          Hib (HbOC) 11/10/1999 Recorded          Hib (HbOC) 11/10/1999 Recorded          DTaP 02/17/1999 Recorded          DTaP 02/17/1999 Recorded          hepatitis B pediatric vaccine 02/17/1999 Recorded          hepatitis B pediatric vaccine 02/17/1999 Recorded          Hib (HbOC) 02/17/1999 Recorded          Hib (HbOC) 02/17/1999 Recorded          DTaP 1998 Recorded          DTaP 1998 Recorded          IPV 1998 Recorded          IPV 1998  Recorded          Hib (HbOC) 1998 Recorded          Hib (HbOC) 1998 Recorded          DTaP 1998 Recorded          DTaP 1998 Recorded          hepatitis B pediatric vaccine 1998 Recorded          hepatitis B pediatric vaccine 1998 Recorded          IPV 1998 Recorded          IPV 1998 Recorded          Hib (HbOC) 1998 Recorded          Hib (HbOC) 1998 Recorded          hepatitis B pediatric vaccine 1998 Recorded          hepatitis B pediatric vaccine 1998 Recorded  Lab Results       Lab Results (Last 4 results within 90 days)        Heterophile Ab: Negative (06/02/20 17:05:00)  called patient to review his lab results with him. He reports that he is looking more yellow. He does not have constant Globus for him just now but it still comes and goes. He s had some heart palpitations no fevers or chills no cough or runny nose. Patient s mother was also present for her phone call. Given that patient condition and seems to be worsening suggested we see if we can coordinate a 23 hour observation to help facilitate his carrier and get some cardiac monitoring. Patient is mom both felt that this would be desirable. Spoke with [facility] however no beds were available. They were able to find a telemetry bed at [facility]. Spoke with hospitalist at [facility] who excepted care of patient. Patient will be directly admitted to room 107  He should wear a mask when he arrives and will not be allowed to have any visitors accompany him while he s in patient.

## 2022-02-16 NOTE — NURSING NOTE
Depression Screening Entered On:  6/4/2020 10:28 AM CDT    Performed On:  6/2/2020 10:28 AM CDT by Ancelmo Mckenna CMA               Depression Screening   Little Interest - Pleasure in Activities :   Not at all   Feeling Down, Depressed, Hopeless :   Not at all   Initial Depression Screen Score :   0    Poor Appetite or Overeating :   Several days   Trouble Falling or Staying Asleep :   More than half the days   Feeling Tired or Little Energy :   Several days   Feeling Bad About Yourself :   Not at all   Trouble Concentrating :   Not at all   Moving or Speaking Slowly :   Not at all   Thoughts Better Off Dead or Hurting Self :   Not at all   LEOLA Difficulty with Work, Home, Others :   Somewhat difficult   Detailed Depression Screen Score :   4    Total Depression Screen Score :   4    Ancelmo Mckenna CMA - 6/4/2020 10:28 AM CDT

## 2022-02-16 NOTE — NURSING NOTE
CAGE Assessment Entered On:  6/4/2020 10:28 AM CDT    Performed On:  6/2/2020 10:28 AM CDT by Ancelmo Mckenna CMA               Assessment   Have you ever felt you should cut down on your drinking :   No   Have people annoyed you by criticizing your drinking :   No   Have you ever felt bad or guilty about your drinking :   No   Have you ever taken a drink first thing in the morning to steady your nerves or get rid of a hangover (Eye-opener) :   No   CAGE Score :   0    Ancelmo Mckenna CMA - 6/4/2020 10:28 AM CDT

## 2022-02-16 NOTE — TELEPHONE ENCOUNTER
---------------------  From: Kandy Rogers   Sent: 6/3/2020 3:51:50 PM CDT  Subject: Curbside Testing     Patient was in for curbside testing. Per . O2 sat=98%. Specimen sent to [facility] lab. Forms faxed to [facility].

## 2022-07-17 ENCOUNTER — HEALTH MAINTENANCE LETTER (OUTPATIENT)
Age: 22
End: 2022-07-17

## 2022-09-25 ENCOUNTER — HEALTH MAINTENANCE LETTER (OUTPATIENT)
Age: 22
End: 2022-09-25

## 2023-08-05 ENCOUNTER — HEALTH MAINTENANCE LETTER (OUTPATIENT)
Age: 23
End: 2023-08-05